# Patient Record
Sex: FEMALE | Race: WHITE | NOT HISPANIC OR LATINO | Employment: STUDENT | URBAN - METROPOLITAN AREA
[De-identification: names, ages, dates, MRNs, and addresses within clinical notes are randomized per-mention and may not be internally consistent; named-entity substitution may affect disease eponyms.]

---
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
## 2024-10-07 ENCOUNTER — OFFICE VISIT (OUTPATIENT)
Dept: URGENT CARE | Facility: CLINIC | Age: 19
End: 2024-10-07
Payer: COMMERCIAL

## 2024-10-07 VITALS
HEIGHT: 66 IN | OXYGEN SATURATION: 99 % | BODY MASS INDEX: 33.01 KG/M2 | TEMPERATURE: 98 F | HEART RATE: 80 BPM | DIASTOLIC BLOOD PRESSURE: 82 MMHG | WEIGHT: 205.38 LBS | RESPIRATION RATE: 18 BRPM | SYSTOLIC BLOOD PRESSURE: 131 MMHG

## 2024-10-07 DIAGNOSIS — N92.1 EXCESSIVE AND FREQUENT MENSTRUATION WITH IRREGULAR CYCLE: Primary | ICD-10-CM

## 2024-10-07 LAB
B-HCG UR QL: NEGATIVE
CTP QC/QA: YES

## 2024-10-07 PROCEDURE — 87591 N.GONORRHOEAE DNA AMP PROB: CPT | Performed by: NURSE PRACTITIONER

## 2024-10-07 PROCEDURE — 87491 CHLMYD TRACH DNA AMP PROBE: CPT | Performed by: NURSE PRACTITIONER

## 2024-10-07 PROCEDURE — 81025 URINE PREGNANCY TEST: CPT | Mod: S$GLB,,, | Performed by: NURSE PRACTITIONER

## 2024-10-07 PROCEDURE — 99213 OFFICE O/P EST LOW 20 MIN: CPT | Mod: S$GLB,,, | Performed by: NURSE PRACTITIONER

## 2024-10-07 RX ORDER — ESTRADIOL 0.5 MG/1
1 TABLET ORAL DAILY
Qty: 14 TABLET | Refills: 0 | Status: SHIPPED | OUTPATIENT
Start: 2024-10-07

## 2024-10-07 NOTE — LETTER
October 7, 2024    Karoline Newberry  [address] [address]             Urgent Care - [facility]  Urgent Care  [address] [address]  Phone: [phone]  Fax: [phone]   October 7, 2024     Patient: Karoline Newberry   YOB: 2005   Date of Visit: 10/7/2024       To Whom it May Concern:    Karoline Newberry was seen in my clinic on 10/7/2024. She may return to school on 10/08/24 .    Please excuse her from any classes or work missed.    If you have any questions or concerns, please don't hesitate to call.    Sincerely,         Meagan Mccrary, NP
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
## 2024-10-08 ENCOUNTER — TELEPHONE (OUTPATIENT)
Dept: URGENT CARE | Facility: CLINIC | Age: 19
End: 2024-10-08
Payer: COMMERCIAL

## 2024-10-08 LAB
C TRACH DNA SPEC QL NAA+PROBE: NOT DETECTED
N GONORRHOEA DNA SPEC QL NAA+PROBE: NOT DETECTED

## 2024-10-08 NOTE — TELEPHONE ENCOUNTER
Attempted to call the patient with negative GC/CT test results.  Unable to leave a message.  Voice mail is not set up.  Will email patient to call the clinic. RTC prn.

## 2024-10-09 ENCOUNTER — PATIENT MESSAGE (OUTPATIENT)
Dept: URGENT CARE | Facility: CLINIC | Age: 19
End: 2024-10-09
Payer: COMMERCIAL

## 2024-11-11 ENCOUNTER — OFFICE VISIT (OUTPATIENT)
Dept: URGENT CARE | Facility: CLINIC | Age: 19
End: 2024-11-11
Payer: COMMERCIAL

## 2024-11-11 VITALS
HEIGHT: 66 IN | BODY MASS INDEX: 32.87 KG/M2 | OXYGEN SATURATION: 97 % | DIASTOLIC BLOOD PRESSURE: 89 MMHG | RESPIRATION RATE: 18 BRPM | WEIGHT: 204.5 LBS | SYSTOLIC BLOOD PRESSURE: 125 MMHG | TEMPERATURE: 99 F | HEART RATE: 69 BPM

## 2024-11-11 DIAGNOSIS — R11.0 NAUSEA: ICD-10-CM

## 2024-11-11 DIAGNOSIS — R52 BODY ACHES: ICD-10-CM

## 2024-11-11 DIAGNOSIS — J02.9 SORE THROAT: ICD-10-CM

## 2024-11-11 DIAGNOSIS — B27.00 INFECTIOUS MONONUCLEOSIS DUE TO EPSTEIN-BARR VIRUS (EBV): Primary | ICD-10-CM

## 2024-11-11 DIAGNOSIS — R68.83 CHILLS (WITHOUT FEVER): ICD-10-CM

## 2024-11-11 DIAGNOSIS — R53.83 OTHER FATIGUE: ICD-10-CM

## 2024-11-11 LAB
CTP QC/QA: YES
HETEROPH AB SER QL: POSITIVE
MOLECULAR STREP A: NEGATIVE
POC MOLECULAR INFLUENZA A AGN: NEGATIVE
POC MOLECULAR INFLUENZA B AGN: NEGATIVE
SARS-COV-2 AG RESP QL IA.RAPID: NEGATIVE

## 2024-11-11 PROCEDURE — 87811 SARS-COV-2 COVID19 W/OPTIC: CPT | Mod: QW,S$GLB,, | Performed by: NURSE PRACTITIONER

## 2024-11-11 PROCEDURE — 99214 OFFICE O/P EST MOD 30 MIN: CPT | Mod: S$GLB,,, | Performed by: NURSE PRACTITIONER

## 2024-11-11 PROCEDURE — 87651 STREP A DNA AMP PROBE: CPT | Mod: QW,S$GLB,, | Performed by: NURSE PRACTITIONER

## 2024-11-11 PROCEDURE — 87502 INFLUENZA DNA AMP PROBE: CPT | Mod: QW,S$GLB,, | Performed by: NURSE PRACTITIONER

## 2024-11-11 PROCEDURE — 86308 HETEROPHILE ANTIBODY SCREEN: CPT | Mod: QW,S$GLB,, | Performed by: NURSE PRACTITIONER

## 2024-11-11 RX ORDER — ONDANSETRON 4 MG/1
4 TABLET, ORALLY DISINTEGRATING ORAL EVERY 8 HOURS PRN
Qty: 20 TABLET | Refills: 0 | Status: SHIPPED | OUTPATIENT
Start: 2024-11-11

## 2024-11-11 RX ORDER — NORETHINDRONE ACETATE AND ETHINYL ESTRADIOL, ETHINYL ESTRADIOL AND FERROUS FUMARATE 1MG-10(24)
1 KIT ORAL
COMMUNITY
Start: 2024-10-22

## 2024-11-11 NOTE — LETTER
November 11, 2024    Karoline Newberry  [address] [address]             Urgent Care - [facility]  Urgent Care  [address] [facility] [address] [address]  Phone: [phone]  Fax: [phone]   November 11, 2024     Patient: Karoline Newberry   YOB: 2005   Date of Visit: 11/11/2024       To Whom it May Concern:    Karoline Newberry was seen in my clinic on 11/11/2024. She may return to school on 11/12/24 .    Please excuse her from any classes or work missed.    If you have any questions or concerns, please don't hesitate to call.    Sincerely,         Meagan Mccrary, NP

## 2024-11-11 NOTE — PROGRESS NOTES
"Subjective:      Patient ID: Karoline Newberry is a 19 y.o. female.    Vitals:  height is 5' 6" (1.676 m) and weight is 92.8 kg (204 lb 7.6 oz). Her oral temperature is 99.2 °F (37.3 °C). Her blood pressure is 125/89 and her pulse is 69. Her respiration is 18 and oxygen saturation is 97%.     Chief Complaint: Sore Throat    This is a 19 y.o. female who presents today with a chief complaint of sore throat chills and sweats that started thursday. Pt states she has been taking ibuprofen.    Sore Throat   This is a new problem. The current episode started in the past 7 days. The problem has been unchanged. There has been no fever. The pain is at a severity of 5/10. The pain is mild. Associated symptoms include swollen glands and trouble swallowing. She has had no exposure to strep. She has tried NSAIDs for the symptoms. The treatment provided mild relief.       HENT:  Positive for sore throat and trouble swallowing.       Objective:     Physical Exam   Constitutional: She is oriented to person, place, and time.  Non-toxic appearance. She does not appear ill. No distress.   HENT:   Head: Normocephalic and atraumatic.   Ears:   Right Ear: Tympanic membrane normal.   Left Ear: Tympanic membrane normal.   Nose: No congestion.   Mouth/Throat: Mucous membranes are moist. Posterior oropharyngeal erythema present. Tonsils are 3+ on the right. Tonsils are 3+ on the left. No tonsillar exudate. Oropharynx is clear.   Eyes: Conjunctivae are normal. Pupils are equal, round, and reactive to light. Extraocular movement intact   Neck:      Comments: Very large TTP tonsillar node swelling   Cardiovascular: Normal rate, regular rhythm, normal heart sounds and normal pulses.   Pulmonary/Chest: Effort normal and breath sounds normal. No respiratory distress. She has no wheezes.   Abdominal: Normal appearance and bowel sounds are normal. Soft. flat abdomen There is no left CVA tenderness and no right CVA tenderness.   Lymphadenopathy:     She " has cervical adenopathy.        Right cervical: Superficial cervical adenopathy present.        Left cervical: Superficial cervical adenopathy present.   Neurological: no focal deficit. She is alert and oriented to person, place, and time.   Skin: Skin is warm, dry and not diaphoretic.   Psychiatric: Her behavior is normal. Mood normal.   Results for orders placed or performed in visit on 11/11/24   POCT Strep A, Molecular    Collection Time: 11/11/24  2:31 PM   Result Value Ref Range    Molecular Strep A, POC Negative Negative     Acceptable Yes    SARS Coronavirus 2 Antigen, POCT Manual Read    Collection Time: 11/11/24  2:58 PM   Result Value Ref Range    SARS Coronavirus 2 Antigen Negative Negative     Acceptable Yes    POCT Influenza A/B MOLECULAR    Collection Time: 11/11/24  3:00 PM   Result Value Ref Range    POC Molecular Influenza A Ag Negative Negative    POC Molecular Influenza B Ag Negative Negative     Acceptable Yes    POCT Infectious mononucleosis antibody    Collection Time: 11/11/24  3:01 PM   Result Value Ref Range    Monospot Positive (A) Negative     Acceptable Yes        Assessment:     1. Infectious mononucleosis due to Jose Daniel-Barr virus (EBV)    2. Sore throat    3. Nausea    4. Body aches    5. Chills (without fever)    6. Other fatigue        Plan:       Infectious mononucleosis due to Jose Daniel-Barr virus (EBV)    Sore throat  -     POCT Strep A, Molecular  -     SARS Coronavirus 2 Antigen, POCT Manual Read  -     POCT Infectious mononucleosis antibody  -     POCT Influenza A/B MOLECULAR    Nausea  -     SARS Coronavirus 2 Antigen, POCT Manual Read  -     POCT Infectious mononucleosis antibody  -     POCT Influenza A/B MOLECULAR  -     ondansetron (ZOFRAN-ODT) 4 MG TbDL; Take 1 tablet (4 mg total) by mouth every 8 (eight) hours as needed (nausea and vomiting).  Dispense: 20 tablet; Refill: 0    Body aches  -     SARS Coronavirus 2  Antigen, POCT Manual Read  -     POCT Infectious mononucleosis antibody  -     POCT Influenza A/B MOLECULAR    Chills (without fever)  -     SARS Coronavirus 2 Antigen, POCT Manual Read  -     POCT Infectious mononucleosis antibody  -     POCT Influenza A/B MOLECULAR    Other fatigue  -     SARS Coronavirus 2 Antigen, POCT Manual Read  -     POCT Infectious mononucleosis antibody

## 2025-09-04 ENCOUNTER — OFFICE VISIT (OUTPATIENT)
Dept: URGENT CARE | Facility: CLINIC | Age: 20
End: 2025-09-04
Payer: COMMERCIAL

## 2025-09-04 VITALS
SYSTOLIC BLOOD PRESSURE: 136 MMHG | HEART RATE: 74 BPM | OXYGEN SATURATION: 99 % | BODY MASS INDEX: 34.17 KG/M2 | HEIGHT: 66 IN | RESPIRATION RATE: 19 BRPM | TEMPERATURE: 98 F | WEIGHT: 212.63 LBS | DIASTOLIC BLOOD PRESSURE: 92 MMHG

## 2025-09-04 DIAGNOSIS — R05.9 COUGH, UNSPECIFIED TYPE: Primary | ICD-10-CM

## 2025-09-04 DIAGNOSIS — Z11.3 SCREENING EXAMINATION FOR STD (SEXUALLY TRANSMITTED DISEASE): ICD-10-CM

## 2025-09-04 DIAGNOSIS — J45.909 ASTHMA, UNSPECIFIED ASTHMA SEVERITY, UNSPECIFIED WHETHER COMPLICATED, UNSPECIFIED WHETHER PERSISTENT: ICD-10-CM

## 2025-09-04 DIAGNOSIS — L98.9 BUMPS ON SKIN: ICD-10-CM

## 2025-09-04 DIAGNOSIS — J40 BRONCHITIS: ICD-10-CM

## 2025-09-04 DIAGNOSIS — R03.0 ELEVATED BLOOD PRESSURE READING: ICD-10-CM

## 2025-09-04 LAB
B-HCG UR QL: NEGATIVE
CTP QC/QA: YES
CTP QC/QA: YES
HCV AB SERPL QL IA: NORMAL
HIV 1+2 AB+HIV1 P24 AG SERPL QL IA: NORMAL
SARS-COV+SARS-COV-2 AG RESP QL IA.RAPID: NEGATIVE
T PALLIDUM IGG+IGM SER QL: NORMAL

## 2025-09-04 RX ORDER — PREDNISONE 20 MG/1
TABLET ORAL
Qty: 8 TABLET | Refills: 0 | Status: SHIPPED | OUTPATIENT
Start: 2025-09-04 | End: 2025-09-09

## 2025-09-04 RX ORDER — DEXTROMETHORPHAN HYDROBROMIDE AND GUAIFENESIN 10; 200 MG/1; MG/1
CAPSULE, GELATIN COATED ORAL
COMMUNITY
Start: 2025-09-04

## 2025-09-04 RX ORDER — BENZONATATE 100 MG/1
100 CAPSULE ORAL 3 TIMES DAILY PRN
Qty: 30 CAPSULE | Refills: 0 | Status: SHIPPED | OUTPATIENT
Start: 2025-09-04 | End: 2025-09-14